# Patient Record
(demographics unavailable — no encounter records)

---

## 2025-04-17 NOTE — HISTORY OF PRESENT ILLNESS
[de-identified] : Dry cough, runny nose, sneezing, watery eyes, dry throat and congestion  [FreeTextEntry6] : Mother states pt has had a dry cough, runny nose, sneezing, watery eyes, dry throat and congestion for a couple of days with no fever. Mother has been administering Claritin.

## 2025-04-17 NOTE — HISTORY OF PRESENT ILLNESS
[de-identified] : Dry cough, runny nose, sneezing, watery eyes, dry throat and congestion  [FreeTextEntry6] : Mother states pt has had a dry cough, runny nose, sneezing, watery eyes, dry throat and congestion for a couple of days with no fever. Mother has been administering Claritin.

## 2025-04-17 NOTE — COUNSELING
How Severe Are Your Spot(S)?: mild What Is The Reason For Today's Visit?: Full Body Skin Examination What Is The Reason For Today's Visit? (Being Monitored For X): the development of new lesions Medical Necessity Information: It is in your best interest to select a reason for this procedure from the list below. All of these items fulfill various CMS LCD requirements except the new and changing color options. [Use of Plain Language] : use of plain language Spray Paint Technique: No [Adequate] : adequate Medical Necessity Clause: This procedure was medically necessary because the lesions that were treated were: [None] : none Show Applicator Variable?: Yes [] : I have reviewed management goals with caretaker and provided a copy of care plan Detail Level: Simple Post-Care Instructions: I reviewed with the patient in detail post-care instructions. Patient is to wear sunprotection, and avoid picking at any of the treated lesions. Pt may apply Vaseline to crusted or scabbing areas. Consent: The patient's consent was obtained including but not limited to risks of crusting, scabbing, blistering, scarring, darker or lighter pigmentary change, recurrence, incomplete removal and infection. Spray Paint Text: The liquid nitrogen was applied to the skin utilizing a spray paint frosting technique.